# Patient Record
Sex: FEMALE | Race: WHITE | Employment: UNEMPLOYED | URBAN - METROPOLITAN AREA
[De-identification: names, ages, dates, MRNs, and addresses within clinical notes are randomized per-mention and may not be internally consistent; named-entity substitution may affect disease eponyms.]

---
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
## 2025-05-10 NOTE — PROGRESS NOTES
Labs improved. CBC has normalized. Still with mildly increased TIBC, can continue doing iron 325 mg every other day. Yes